# Patient Record
Sex: MALE
[De-identification: names, ages, dates, MRNs, and addresses within clinical notes are randomized per-mention and may not be internally consistent; named-entity substitution may affect disease eponyms.]

---

## 2021-01-14 PROBLEM — Z00.00 ENCOUNTER FOR PREVENTIVE HEALTH EXAMINATION: Status: ACTIVE | Noted: 2021-01-14

## 2021-01-20 ENCOUNTER — APPOINTMENT (OUTPATIENT)
Dept: INTERNAL MEDICINE | Facility: CLINIC | Age: 26
End: 2021-01-20
Payer: MEDICAID

## 2021-01-20 ENCOUNTER — APPOINTMENT (OUTPATIENT)
Dept: INTERNAL MEDICINE | Facility: CLINIC | Age: 26
End: 2021-01-20

## 2021-01-20 VITALS — WEIGHT: 186 LBS | HEIGHT: 67 IN | BODY MASS INDEX: 29.19 KG/M2

## 2021-01-20 DIAGNOSIS — U07.1 COVID-19: ICD-10-CM

## 2021-01-20 DIAGNOSIS — Z78.9 OTHER SPECIFIED HEALTH STATUS: ICD-10-CM

## 2021-01-20 PROCEDURE — 99202 OFFICE O/P NEW SF 15 MIN: CPT | Mod: 95

## 2021-01-20 NOTE — ASSESSMENT
[FreeTextEntry1] : 1. COVID 19 infection\par * patient recovered, asymptomatic\par * completed initial quarantine today, by CDC guidelines he is no longer contagious\par * advise him not to test again with PCR swab until at least another two weeks , since it may still be positive\par * to schedule COVID serum antibodies test for next week\par * clearance letter typed , will pick it up\par * schedule follow up in one month for CPE

## 2021-01-20 NOTE — HISTORY OF PRESENT ILLNESS
[Home] : at home, [unfilled] , at the time of the visit. [Medical Office: ([facility])___] : at the medical office located in  [Verbal consent obtained from patient] : the patient, [unfilled] [FreeTextEntry8] : Telehealth visit was delivered after patient consent was obtained; patient agrees to discuss health issues via video conference. Risk/limitations involved in medical telehealth visits were reviewed, including limitations of physical exam. Patient was asked for identifiers by stating name and ; patient was asked to place him/her self in private area. Patient understand that an office visit may be indicated after telehealth visit if no symptomatic improvement. Patient should contact us for worsening symptoms or go to ER.\par 25 years old male seen because COVID 19 infection, states he tested positive for COVID on 21; had symptoms two days prior , nasal congestion, headache, exposed to sister's friend, he denies having fever, cough or any other symptom, he was tested again on 21 came out positive , was advised by [facility] to remain quarantine for another 10 days; he states feeling well today totally asymptomatic, afebrile, no coughing; no GI symptoms, would like to go back to work, requesting clearance letter, time spent 20 minutes

## 2021-01-20 NOTE — PHYSICAL EXAM
[No Accessory Muscle Use] : no accessory muscle use [No Respiratory Distress] : no respiratory distress  [de-identified] : patient appears well , no distress

## 2023-05-10 ENCOUNTER — APPOINTMENT (OUTPATIENT)
Dept: INTERNAL MEDICINE | Facility: CLINIC | Age: 28
End: 2023-05-10
Payer: MEDICAID

## 2023-05-10 VITALS
HEART RATE: 83 BPM | TEMPERATURE: 97.3 F | RESPIRATION RATE: 18 BRPM | OXYGEN SATURATION: 98 % | WEIGHT: 192 LBS | HEIGHT: 67 IN | SYSTOLIC BLOOD PRESSURE: 120 MMHG | DIASTOLIC BLOOD PRESSURE: 74 MMHG | BODY MASS INDEX: 30.13 KG/M2

## 2023-05-10 DIAGNOSIS — E66.9 OBESITY, UNSPECIFIED: ICD-10-CM

## 2023-05-10 DIAGNOSIS — Z00.00 ENCOUNTER FOR GENERAL ADULT MEDICAL EXAMINATION W/OUT ABNORMAL FINDINGS: ICD-10-CM

## 2023-05-10 DIAGNOSIS — H02.821 CYSTS OF RIGHT UPPER EYELID: ICD-10-CM

## 2023-05-10 PROCEDURE — 99385 PREV VISIT NEW AGE 18-39: CPT

## 2023-05-10 NOTE — HISTORY OF PRESENT ILLNESS
[de-identified] : 27 years old male presents for annual physical exam, refers right eyelid small nodule , had redness last week , painless

## 2023-05-10 NOTE — PLAN
[FreeTextEntry1] : * routine general labs done\par * age appropriate health maintenance recommendations reviewed, discussed including healthy diet, regular exercise\par * patient counselled on healthy diet with limited calories, regular exercises, low fat choices, limit or eliminate junk food, and to have family meals as often as possible.\par * ophthalmology referral\par * f/u one month.

## 2023-05-10 NOTE — HEALTH RISK ASSESSMENT
[Good] : ~his/her~  mood as  good [Yes] : Yes [2 - 4 times a month (2 pts)] : 2-4 times a month (2 points) [3 or 4 (1 pt)] : 3 or 4  (1 point) [Never (0 pts)] : Never (0 points) [No falls in past year] : Patient reported no falls in the past year [0] : 2) Feeling down, depressed, or hopeless: Not at all (0) [PHQ-2 Negative - No further assessment needed] : PHQ-2 Negative - No further assessment needed [HIV test declined] : HIV test declined [Hepatitis C test declined] : Hepatitis C test declined [None] : None [With Family] : lives with family [Employed] : employed [College] : College [Single] : single [Sexually Active] : sexually active [Feels Safe at Home] : Feels safe at home [Fully functional (bathing, dressing, toileting, transferring, walking, feeding)] : Fully functional (bathing, dressing, toileting, transferring, walking, feeding) [Fully functional (using the telephone, shopping, preparing meals, housekeeping, doing laundry, using] : Fully functional and needs no help or supervision to perform IADLs (using the telephone, shopping, preparing meals, housekeeping, doing laundry, using transportation, managing medications and managing finances) [Carbon Monoxide Detector] : carbon monoxide detector [Seat Belt] :  uses seat belt [Never] : Never [GBY0Squvc] : 0 [Change in mental status noted] : No change in mental status noted [Reports changes in hearing] : Reports no changes in hearing [Reports changes in vision] : Reports no changes in vision [Reports changes in dental health] : Reports no changes in dental health [Smoke Detector] : no smoke detector [Guns at Home] : no guns at home [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

## 2023-05-11 LAB
25(OH)D3 SERPL-MCNC: 17.1 NG/ML
ALBUMIN SERPL ELPH-MCNC: 4.9 G/DL
ALP BLD-CCNC: 95 U/L
ALT SERPL-CCNC: 47 U/L
ANION GAP SERPL CALC-SCNC: 17 MMOL/L
APPEARANCE: ABNORMAL
AST SERPL-CCNC: 31 U/L
BACTERIA: NEGATIVE /HPF
BASOPHILS # BLD AUTO: 0.09 K/UL
BASOPHILS NFR BLD AUTO: 1.2 %
BILIRUB SERPL-MCNC: 0.7 MG/DL
BILIRUBIN URINE: NEGATIVE
BLOOD URINE: NEGATIVE
BUN SERPL-MCNC: 13 MG/DL
CALCIUM SERPL-MCNC: 9.5 MG/DL
CAST: 2 /LPF
CHLORIDE SERPL-SCNC: 102 MMOL/L
CHOLEST SERPL-MCNC: 231 MG/DL
CO2 SERPL-SCNC: 22 MMOL/L
COLOR: YELLOW
CREAT SERPL-MCNC: 1.16 MG/DL
EGFR: 89 ML/MIN/1.73M2
EOSINOPHIL # BLD AUTO: 0.38 K/UL
EOSINOPHIL NFR BLD AUTO: 4.9 %
EPITHELIAL CELLS: 4 /HPF
ESTIMATED AVERAGE GLUCOSE: 111 MG/DL
GLUCOSE QUALITATIVE U: NEGATIVE MG/DL
GLUCOSE SERPL-MCNC: 72 MG/DL
HBA1C MFR BLD HPLC: 5.5 %
HCT VFR BLD CALC: 49.3 %
HDLC SERPL-MCNC: 47 MG/DL
HGB BLD-MCNC: 15.1 G/DL
IMM GRANULOCYTES NFR BLD AUTO: 0.8 %
KETONES URINE: NEGATIVE MG/DL
LDLC SERPL CALC-MCNC: 162 MG/DL
LEUKOCYTE ESTERASE URINE: ABNORMAL
LYMPHOCYTES # BLD AUTO: 1.54 K/UL
LYMPHOCYTES NFR BLD AUTO: 20 %
MAN DIFF?: NORMAL
MCHC RBC-ENTMCNC: 27.7 PG
MCHC RBC-ENTMCNC: 30.6 GM/DL
MCV RBC AUTO: 90.3 FL
MICROSCOPIC-UA: NORMAL
MONOCYTES # BLD AUTO: 1.25 K/UL
MONOCYTES NFR BLD AUTO: 16.2 %
NEUTROPHILS # BLD AUTO: 4.38 K/UL
NEUTROPHILS NFR BLD AUTO: 56.9 %
NITRITE URINE: NEGATIVE
NONHDLC SERPL-MCNC: 183 MG/DL
PH URINE: 5.5
PLATELET # BLD AUTO: 220 K/UL
POTASSIUM SERPL-SCNC: 4.6 MMOL/L
PROT SERPL-MCNC: 7.8 G/DL
PROTEIN URINE: NORMAL MG/DL
RBC # BLD: 5.46 M/UL
RBC # FLD: 14.1 %
RED BLOOD CELLS URINE: 1 /HPF
SODIUM SERPL-SCNC: 141 MMOL/L
SPECIFIC GRAVITY URINE: 1.03
T4 FREE SERPL-MCNC: 1.3 NG/DL
TRIGL SERPL-MCNC: 107 MG/DL
TSH SERPL-ACNC: 0.73 UIU/ML
UROBILINOGEN URINE: 0.2 MG/DL
VIT B12 SERPL-MCNC: 326 PG/ML
WBC # FLD AUTO: 7.7 K/UL
WHITE BLOOD CELLS URINE: 60 /HPF

## 2023-06-07 ENCOUNTER — APPOINTMENT (OUTPATIENT)
Dept: INTERNAL MEDICINE | Facility: CLINIC | Age: 28
End: 2023-06-07

## 2023-06-08 ENCOUNTER — APPOINTMENT (OUTPATIENT)
Dept: OPHTHALMOLOGY | Facility: CLINIC | Age: 28
End: 2023-06-08

## 2023-06-19 ENCOUNTER — APPOINTMENT (OUTPATIENT)
Dept: OPHTHALMOLOGY | Facility: CLINIC | Age: 28
End: 2023-06-19
Payer: MEDICAID

## 2023-06-19 ENCOUNTER — NON-APPOINTMENT (OUTPATIENT)
Age: 28
End: 2023-06-19

## 2023-06-19 PROCEDURE — 99204 OFFICE O/P NEW MOD 45 MIN: CPT

## 2023-07-06 ENCOUNTER — APPOINTMENT (OUTPATIENT)
Dept: OPHTHALMOLOGY | Facility: CLINIC | Age: 28
End: 2023-07-06
Payer: MEDICAID

## 2023-07-06 ENCOUNTER — NON-APPOINTMENT (OUTPATIENT)
Age: 28
End: 2023-07-06

## 2023-07-06 PROCEDURE — 99213 OFFICE O/P EST LOW 20 MIN: CPT

## 2023-07-06 PROCEDURE — 92285 EXTERNAL OCULAR PHOTOGRAPHY: CPT

## 2023-07-08 ENCOUNTER — EMERGENCY (EMERGENCY)
Facility: HOSPITAL | Age: 28
LOS: 1 days | Discharge: HOME | End: 2023-07-08
Attending: EMERGENCY MEDICINE
Payer: MEDICAID

## 2023-07-08 VITALS
SYSTOLIC BLOOD PRESSURE: 144 MMHG | HEIGHT: 67 IN | TEMPERATURE: 99 F | WEIGHT: 197.98 LBS | RESPIRATION RATE: 18 BRPM | HEART RATE: 84 BPM | DIASTOLIC BLOOD PRESSURE: 95 MMHG | OXYGEN SATURATION: 98 %

## 2023-07-08 PROCEDURE — 76377 3D RENDER W/INTRP POSTPROCES: CPT | Mod: 26

## 2023-07-08 PROCEDURE — 99284 EMERGENCY DEPT VISIT MOD MDM: CPT

## 2023-07-08 PROCEDURE — 70486 CT MAXILLOFACIAL W/O DYE: CPT | Mod: 26,MA

## 2023-07-08 RX ORDER — TETANUS TOXOID, REDUCED DIPHTHERIA TOXOID AND ACELLULAR PERTUSSIS VACCINE, ADSORBED 5; 2.5; 8; 8; 2.5 [IU]/.5ML; [IU]/.5ML; UG/.5ML; UG/.5ML; UG/.5ML
0.5 SUSPENSION INTRAMUSCULAR ONCE
Refills: 0 | Status: COMPLETED | OUTPATIENT
Start: 2023-07-08 | End: 2023-07-08

## 2023-07-08 RX ORDER — CEPHALEXIN 500 MG
1 CAPSULE ORAL
Qty: 28 | Refills: 0
Start: 2023-07-08 | End: 2023-07-14

## 2023-07-08 RX ORDER — CEPHALEXIN 500 MG
500 CAPSULE ORAL ONCE
Refills: 0 | Status: COMPLETED | OUTPATIENT
Start: 2023-07-08 | End: 2023-07-08

## 2023-07-08 RX ORDER — IBUPROFEN 200 MG
600 TABLET ORAL ONCE
Refills: 0 | Status: COMPLETED | OUTPATIENT
Start: 2023-07-08 | End: 2023-07-08

## 2023-07-08 RX ADMIN — Medication 1 TABLET(S): at 23:50

## 2023-07-08 RX ADMIN — Medication 600 MILLIGRAM(S): at 23:46

## 2023-07-08 RX ADMIN — Medication 500 MILLIGRAM(S): at 23:50

## 2023-07-08 RX ADMIN — TETANUS TOXOID, REDUCED DIPHTHERIA TOXOID AND ACELLULAR PERTUSSIS VACCINE, ADSORBED 0.5 MILLILITER(S): 5; 2.5; 8; 8; 2.5 SUSPENSION INTRAMUSCULAR at 19:57
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
## 2023-07-08 NOTE — ED PROVIDER NOTE - DIFFERENTIAL DIAGNOSIS
Differential Diagnosis Ddx includes, however, is not limited to: facial injury, hematoma, fx, abscess
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
## 2023-07-08 NOTE — ED PROVIDER NOTE - NSFOLLOWUPINSTRUCTIONS_ED_ALL_ED_FT
Please see the information of abscess and facial contusion.    Ice to swelling area, your face for 2-3days; every 2hours for 20minutes.    Keep the axillary wound clean and dry and return in 2days for wound check.    Take Keflex and Bactrim as prescribed for axillary abscess.     Take Ibuprofen (600mg every 8hours with food) or/and Tylenol (2 tablets of 500mg every 8hours) as needed for pain.    Follow up with your Dr. for reevaluation, call Monday for appointment.    Return for any concerns, fever, numbness, eye pain, or worsening pain. Please see the information of abscess and facial contusion.    Ice to swelling area, your face for 2-3days; every 2hours for 20minutes.    AVOID straws, blowing your nose, any foreign bodies in your noise.  Soft diet.    Keep the axillary wound clean and dry and return in 2days for wound check.    Take Augmentin (amoxicillin/clavulinic acid) and Bactrim as prescribed for axillary abscess and coverage of your facial fractures.     Take Ibuprofen (600mg every 8hours with food) or/and Tylenol (2 tablets of 500mg every 8hours) as needed for pain.    Follow up with your Doctor for reevaluation, call Monday for appointment.    Return for any concerns, fever, numbness, eye pain, or worsening pain.

## 2023-07-08 NOTE — ED PROVIDER NOTE - PHYSICAL EXAMINATION
NAD. VSS. Afebrile. +PERRL, EOMI without tender. +Left periorbital ecchymosis, tender, and swelling. +Superficial abrasion on left eyebrow and nasal dorsum without infection sign. +Nasal tender and mild swelling. No preseptal hematoma or epistaxis. Neck supple. Lungs clear. No spinal tender. No chest wall, rib or cva tender. +Right axilla; Fluctuating swelling 1x3cm with tender and eryth. ABD soft, non tender. Neuro- intact.

## 2023-07-08 NOTE — ED ADULT NURSE NOTE - NSFALLRISKASMTTYPE_ED_ALL_ED
We are committed to providing you with the best care possible. In order to help us achieve these goals please remember to bring all medications, herbal products, and over the counter supplements with you to each visit. If your provider has ordered testing for you, please be sure to follow up with our office if you have not received results within 7 days after the testing took place. *If you receive a survey after visiting one of our offices, please take time to share your experience concerning your physician office visit. These surveys are confidential and no health information about you is shared. We are eager to improve for you and we are counting on your feedback to help make that happen. Initial (On Arrival)
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
## 2023-07-08 NOTE — ED PROVIDER NOTE - PATIENT PORTAL LINK FT
You can access the FollowMyHealth Patient Portal offered by [facility] by registering at the following website: [email or web address]. By joining [employer]’s FollowMyHealth portal, you will also be able to view your health information using other applications (apps) compatible with our system.
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
## 2023-07-09 VITALS
SYSTOLIC BLOOD PRESSURE: 138 MMHG | TEMPERATURE: 98 F | RESPIRATION RATE: 18 BRPM | DIASTOLIC BLOOD PRESSURE: 73 MMHG | OXYGEN SATURATION: 100 % | HEART RATE: 58 BPM

## 2023-07-09 PROCEDURE — 90471 IMMUNIZATION ADMIN: CPT

## 2023-07-09 PROCEDURE — 99284 EMERGENCY DEPT VISIT MOD MDM: CPT | Mod: 25

## 2023-07-09 PROCEDURE — 90715 TDAP VACCINE 7 YRS/> IM: CPT

## 2023-07-09 PROCEDURE — 76377 3D RENDER W/INTRP POSTPROCES: CPT

## 2023-07-09 PROCEDURE — 87070 CULTURE OTHR SPECIMN AEROBIC: CPT

## 2023-07-09 PROCEDURE — 70486 CT MAXILLOFACIAL W/O DYE: CPT | Mod: MA

## 2023-07-09 PROCEDURE — 10060 I&D ABSCESS SIMPLE/SINGLE: CPT | Mod: RT

## 2023-07-09 RX ORDER — OXYCODONE HYDROCHLORIDE 5 MG/1
1 TABLET ORAL
Qty: 6 | Refills: 0
Start: 2023-07-09 | End: 2023-07-10

## 2023-07-09 RX ORDER — OXYCODONE HYDROCHLORIDE 5 MG/1
5 TABLET ORAL ONCE
Refills: 0 | Status: DISCONTINUED | OUTPATIENT
Start: 2023-07-09 | End: 2023-07-09

## 2023-07-09 RX ADMIN — OXYCODONE HYDROCHLORIDE 5 MILLIGRAM(S): 5 TABLET ORAL at 02:54
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
## 2023-07-10 ENCOUNTER — EMERGENCY (EMERGENCY)
Facility: HOSPITAL | Age: 28
LOS: 1 days | Discharge: HOME | End: 2023-07-10
Attending: STUDENT IN AN ORGANIZED HEALTH CARE EDUCATION/TRAINING PROGRAM
Payer: MEDICAID

## 2023-07-10 VITALS
HEIGHT: 67 IN | SYSTOLIC BLOOD PRESSURE: 130 MMHG | TEMPERATURE: 98 F | WEIGHT: 190.04 LBS | HEART RATE: 69 BPM | OXYGEN SATURATION: 98 % | DIASTOLIC BLOOD PRESSURE: 85 MMHG | RESPIRATION RATE: 18 BRPM

## 2023-07-10 VITALS
DIASTOLIC BLOOD PRESSURE: 85 MMHG | SYSTOLIC BLOOD PRESSURE: 133 MMHG | HEART RATE: 61 BPM | RESPIRATION RATE: 16 BRPM | OXYGEN SATURATION: 99 % | TEMPERATURE: 98 F

## 2023-07-10 PROCEDURE — 99283 EMERGENCY DEPT VISIT LOW MDM: CPT

## 2023-07-10 PROCEDURE — G0463: CPT

## 2023-07-10 RX ORDER — ACETAMINOPHEN 500 MG
975 TABLET ORAL ONCE
Refills: 0 | Status: COMPLETED | OUTPATIENT
Start: 2023-07-10 | End: 2023-07-10

## 2023-07-10 RX ORDER — OXYCODONE HYDROCHLORIDE 5 MG/1
5 TABLET ORAL ONCE
Refills: 0 | Status: DISCONTINUED | OUTPATIENT
Start: 2023-07-10 | End: 2023-07-10

## 2023-07-10 RX ADMIN — Medication 975 MILLIGRAM(S): at 17:39

## 2023-07-10 RX ADMIN — OXYCODONE HYDROCHLORIDE 5 MILLIGRAM(S): 5 TABLET ORAL at 17:39

## 2023-07-10 NOTE — ED PROVIDER NOTE - CLINICAL SUMMARY MEDICAL DECISION MAKING FREE TEXT BOX
27M no PMH presenting for wound check. Had I&D on 7/8/23 for abscess to R axilla, started on Bactrim/Augmentin, which he has been taking regularly W/O missed doses. No fevers, purulent/bloody drainage. Site appears clean, mild induration but no erythema/fluctuance. Packing removed, no additional fluid expressed. Advised to C/W antibiotics, has appointment to see PMD tomorrow. Will give pain medication here and likely discharge home. -Shaila Valenzuela MD (Attending)

## 2023-07-10 NOTE — ED PROVIDER NOTE - SKIN WOUND DESCRIPTION
R axilla I&D site without surrounding erythema, mild induration at incision site, no fluctuance, no purulent drainage noted, packing removed during exam

## 2023-07-10 NOTE — ED PROVIDER NOTE - OBJECTIVE STATEMENT
28yo M with I&D of R axilla abscess on 7/8/23 presenting for wound check. Reports discomfort but no worsening pain. Last took motrin 3 hours ago. Took oxycodone last night, none today. Has family member at bedside to drive him home. Denies any fever/chills, drainage from wound, n/v. Wound cx prelim with normal skin rosy. Has PCP f/u tomorrow. 26yo M with I&D of R axilla abscess on 7/8/23 presenting for wound check. Reports discomfort at wound site but no worsening pain. Last took motrin 3 hours ago. Took oxycodone last night, none today. Has family member at bedside to drive him home. Denies any fever/chills, drainage from wound, n/v. Wound cx prelim with normal skin rosy. Has PCP f/u tomorrow.

## 2023-07-10 NOTE — ED ADULT NURSE NOTE - NSFALLUNIVINTERV_ED_ALL_ED
Bed/Stretcher in lowest position, wheels locked, appropriate side rails in place/Call bell, personal items and telephone in reach/Instruct patient to call for assistance before getting out of bed/chair/stretcher/Non-slip footwear applied when patient is off stretcher/[place] to call system/Physically safe environment - no spills, clutter or unnecessary equipment/Purposeful proactive rounding/Room/bathroom lighting operational, light cord in reach

## 2023-07-10 NOTE — ED PROVIDER NOTE - PATIENT PORTAL LINK FT
You can access the FollowMyHealth Patient Portal offered by [facility] by registering at the following website: [email or web address]. By joining [employer]’s FollowMyHealth portal, you will also be able to view your health information using other applications (apps) compatible with our system.

## 2023-07-10 NOTE — ED PROVIDER NOTE - NSFOLLOWUPINSTRUCTIONS_ED_ALL_ED_FT
Stay well hydrated.     Continue to take Ibuprofen (i.e. Motrin, Advil) 600mg every 8 hrs for pain as needed. Take with food.  May alternate with Acetaminophen (Tylenol) 650mg every 6 hours for pain as needed.   * caution oxycodone can make you drowsy, do not drive or operate machinery today.    Continue antibiotics as prescribed.     Follow up with your primary care provider at appointment tomorrow.     Return to ER for any worsening redness, swelling, streaking (red lines), fever, chills, worsening pain, nausea/vomiting, or any other concerning symptoms.

## 2023-07-10 NOTE — ED ADULT TRIAGE NOTE - CHIEF COMPLAINT QUOTE
had abscess drained on Saturday- was told to come back to the ED today. Pt denies fevers chills or pain to the area.

## 2023-07-11 ENCOUNTER — APPOINTMENT (OUTPATIENT)
Dept: INTERNAL MEDICINE | Facility: CLINIC | Age: 28
End: 2023-07-11
Payer: MEDICAID

## 2023-07-11 VITALS
OXYGEN SATURATION: 99 % | HEIGHT: 67 IN | WEIGHT: 190 LBS | SYSTOLIC BLOOD PRESSURE: 125 MMHG | HEART RATE: 73 BPM | TEMPERATURE: 98 F | BODY MASS INDEX: 29.82 KG/M2 | DIASTOLIC BLOOD PRESSURE: 82 MMHG

## 2023-07-11 DIAGNOSIS — S02.2XXA FRACTURE OF NASAL BONES, INITIAL ENCOUNTER FOR CLOSED FRACTURE: ICD-10-CM

## 2023-07-11 LAB
CULTURE RESULTS: SIGNIFICANT CHANGE UP
SPECIMEN SOURCE: SIGNIFICANT CHANGE UP

## 2023-07-11 PROCEDURE — 99214 OFFICE O/P EST MOD 30 MIN: CPT
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
## 2023-07-11 NOTE — PLAN
[FreeTextEntry1] : * instructed to continue with Augmentin and Bactrim as prescribed\par * daily abscess wound care with sterile water or saline solution; cover it with sterile gauze\par * ENT referral for nasal bones fracture\par * low cholesterol, low triglycerides diet,dietary counseling given; dietary avoidance discussed; diet and exercise reviewed with patient\par * recommended daily vitamin D supplement\par * f/u as scheduled in six weeks

## 2023-07-11 NOTE — PHYSICAL EXAM
[No Acute Distress] : no acute distress [Well Nourished] : well nourished [Well Developed] : well developed [Well-Appearing] : well-appearing [Normal Sclera/Conjunctiva] : normal sclera/conjunctiva [PERRL] : pupils equal round and reactive to light [EOMI] : extraocular movements intact [Normal Outer Ear/Nose] : the outer ears and nose were normal in appearance [Normal Oropharynx] : the oropharynx was normal [No JVD] : no jugular venous distention [No Lymphadenopathy] : no lymphadenopathy [Supple] : supple [Thyroid Normal, No Nodules] : the thyroid was normal and there were no nodules present [No Respiratory Distress] : no respiratory distress  [No Accessory Muscle Use] : no accessory muscle use [Clear to Auscultation] : lungs were clear to auscultation bilaterally [Normal Rate] : normal rate  [Regular Rhythm] : with a regular rhythm [Normal S1, S2] : normal S1 and S2 [No Murmur] : no murmur heard [No Carotid Bruits] : no carotid bruits [No Abdominal Bruit] : a ~M bruit was not heard ~T in the abdomen [No Varicosities] : no varicosities [Pedal Pulses Present] : the pedal pulses are present [No Edema] : there was no peripheral edema [No Palpable Aorta] : no palpable aorta [No Extremity Clubbing/Cyanosis] : no extremity clubbing/cyanosis [Soft] : abdomen soft [Non Tender] : non-tender [Non-distended] : non-distended [No Masses] : no abdominal mass palpated [No HSM] : no HSM [Normal Bowel Sounds] : normal bowel sounds [Normal Posterior Cervical Nodes] : no posterior cervical lymphadenopathy [Normal Anterior Cervical Nodes] : no anterior cervical lymphadenopathy [No CVA Tenderness] : no CVA  tenderness [No Spinal Tenderness] : no spinal tenderness [No Joint Swelling] : no joint swelling [Grossly Normal Strength/Tone] : grossly normal strength/tone [No Rash] : no rash [Coordination Grossly Intact] : coordination grossly intact [No Focal Deficits] : no focal deficits [Normal Gait] : normal gait [Deep Tendon Reflexes (DTR)] : deep tendon reflexes were 2+ and symmetric [Normal Affect] : the affect was normal [Normal Insight/Judgement] : insight and judgment were intact [de-identified] : ecchymose left eye orbital tissue [de-identified] : right axilla abscess , drained

## 2023-07-12 NOTE — ED POST DISCHARGE NOTE - RESULT SUMMARY
7/12/23: abscess cx - normal rosy, pt returned for wound check found to be improving, no need for contact regarding these results. -Kris Alvarez PA-C

## 2023-07-13 NOTE — ED PROVIDER NOTE - CROS ED SKIN NEG
Message left for pt that PCP is out of the office and will address his request on her return     Lisbeth Jones RN on 7/13/2023 at 1:41 PM     no rash

## 2023-08-21 NOTE — ED PROCEDURE NOTE - CPROC ED TIME OUT STATEMENT1
Patient has appt today at 1:40. Encounter closed.   “Patient's name, , procedure and correct site were confirmed during the [place] Timeout.”

## 2023-08-22 ENCOUNTER — APPOINTMENT (OUTPATIENT)
Dept: INTERNAL MEDICINE | Facility: CLINIC | Age: 28
End: 2023-08-22
Payer: SELF-PAY

## 2023-08-22 VITALS
TEMPERATURE: 97.9 F | OXYGEN SATURATION: 99 % | HEART RATE: 82 BPM | DIASTOLIC BLOOD PRESSURE: 73 MMHG | SYSTOLIC BLOOD PRESSURE: 111 MMHG | WEIGHT: 190 LBS | HEIGHT: 67 IN | BODY MASS INDEX: 29.82 KG/M2

## 2023-08-22 DIAGNOSIS — E78.00 PURE HYPERCHOLESTEROLEMIA, UNSPECIFIED: ICD-10-CM

## 2023-08-22 DIAGNOSIS — E55.9 VITAMIN D DEFICIENCY, UNSPECIFIED: ICD-10-CM

## 2023-08-22 DIAGNOSIS — L02.411 CUTANEOUS ABSCESS OF RIGHT AXILLA: ICD-10-CM

## 2023-08-22 PROCEDURE — 99214 OFFICE O/P EST MOD 30 MIN: CPT

## 2023-08-22 NOTE — PLAN
[FreeTextEntry1] : * low cholesterol, low triglycerides diet,dietary counseling given; dietary avoidance discussed; diet and exercise reviewed with patient * recommended to c/w vitamin D supplementation * abscess healed * referral to repeat fasting labs for CMP, lipids, serum vitamin D in three months * f/u three months

## 2023-08-22 NOTE — HISTORY OF PRESENT ILLNESS
[FreeTextEntry1] : follow up  [de-identified] : 27 years old male presents for follow up right axilla abscess, states now healed; no secretion